# Patient Record
Sex: MALE | Race: WHITE
[De-identification: names, ages, dates, MRNs, and addresses within clinical notes are randomized per-mention and may not be internally consistent; named-entity substitution may affect disease eponyms.]

---

## 2020-11-30 ENCOUNTER — HOSPITAL ENCOUNTER (EMERGENCY)
Dept: HOSPITAL 4 - SED | Age: 35
Discharge: HOME | End: 2020-11-30
Payer: SELF-PAY

## 2020-11-30 VITALS — HEIGHT: 70 IN | BODY MASS INDEX: 28.63 KG/M2 | WEIGHT: 200 LBS

## 2020-11-30 VITALS — SYSTOLIC BLOOD PRESSURE: 163 MMHG

## 2020-11-30 VITALS — SYSTOLIC BLOOD PRESSURE: 145 MMHG

## 2020-11-30 DIAGNOSIS — R07.89: Primary | ICD-10-CM

## 2020-11-30 DIAGNOSIS — I10: ICD-10-CM

## 2020-11-30 NOTE — NUR
Placed in room 1  . Placed on cardiac monitor, blood pressure machine and pulse 
oximeter. To gown for exam. Side rails up.

## 2020-11-30 NOTE — NUR
pt from home a&o x4 c/o of left sided substernal chest pain, dull and 
nonradiating, that started while he was exercising about 45 mins ago. pt 
reports he stopped exercising immediately because he became anxious. pt reports 
his chest pain is now a 2 out of 10. pt denies SOB.

## 2020-11-30 NOTE — NUR
-------------------------------------------------------------------------------

            *** Note undone in EDM - 11/30/20 at 2333 by ELISA ***            

-------------------------------------------------------------------------------

Patient given written and verbal discharge instructions and verbalizes 
understanding.  ER MD discussed with patient the results and treatment 
provided. Patient in stable condition. ID arm band removed. 

Rx of lisinopril given. Patient educated on pain management and to follow up 
with PMD. Pain Scale 2/10.

Opportunity for questions provided and answered. Medication side effect fact 
sheet provided.

## 2020-11-30 NOTE — NUR
Patient given written and verbal discharge instructions by Dr. Partida and 
verbalizes understanding.  ER MD discussed with patient the results and 
treatment provided. Patient in stable condition. ID arm band removed. 

Rx of lisinopril given. Patient educated on pain management and to follow up 
with PMD. Pain Scale 2/10.

Opportunity for questions provided and answered. Medication side effect fact 
sheet provided.

## 2020-11-30 NOTE — NUR
-------------------------------------------------------------------------------

            *** Note undone in ED - 11/30/20 at 2313 by SDEDCA ***            

-------------------------------------------------------------------------------

KEY Partida at bedside examining patient.